# Patient Record
Sex: MALE | Race: WHITE | NOT HISPANIC OR LATINO
[De-identification: names, ages, dates, MRNs, and addresses within clinical notes are randomized per-mention and may not be internally consistent; named-entity substitution may affect disease eponyms.]

---

## 2020-10-22 PROBLEM — Z00.00 ENCOUNTER FOR PREVENTIVE HEALTH EXAMINATION: Status: ACTIVE | Noted: 2020-10-22

## 2020-10-26 ENCOUNTER — APPOINTMENT (OUTPATIENT)
Dept: INFECTIOUS DISEASE | Facility: CLINIC | Age: 20
End: 2020-10-26
Payer: COMMERCIAL

## 2020-10-26 ENCOUNTER — LABORATORY RESULT (OUTPATIENT)
Age: 20
End: 2020-10-26

## 2020-10-26 VITALS
BODY MASS INDEX: 22.13 KG/M2 | SYSTOLIC BLOOD PRESSURE: 121 MMHG | WEIGHT: 167 LBS | OXYGEN SATURATION: 96 % | TEMPERATURE: 98.8 F | HEART RATE: 86 BPM | DIASTOLIC BLOOD PRESSURE: 79 MMHG | HEIGHT: 73 IN

## 2020-10-26 DIAGNOSIS — A69.23 ARTHRITIS DUE TO LYME DISEASE: ICD-10-CM

## 2020-10-26 DIAGNOSIS — M25.569 PAIN IN UNSPECIFIED KNEE: ICD-10-CM

## 2020-10-26 PROCEDURE — 99205 OFFICE O/P NEW HI 60 MIN: CPT | Mod: 25

## 2020-10-26 PROCEDURE — 99072 ADDL SUPL MATRL&STAF TM PHE: CPT

## 2020-10-27 LAB
ALBUMIN SERPL ELPH-MCNC: 5 G/DL
ALP BLD-CCNC: 83 U/L
ALT SERPL-CCNC: 18 U/L
ANION GAP SERPL CALC-SCNC: 15 MMOL/L
AST SERPL-CCNC: 20 U/L
B BURGDOR AB SER-IMP: POSITIVE
B BURGDOR IGG+IGM SER QL IB: NORMAL
B BURGDOR IGM PATRN SER IB-IMP: NEGATIVE
B BURGDOR18KD IGG SER QL IB: PRESENT
B BURGDOR23KD IGG SER QL IB: PRESENT
B BURGDOR23KD IGM SER QL IB: NORMAL
B BURGDOR28KD IGG SER QL IB: PRESENT
B BURGDOR30KD IGG SER QL IB: NORMAL
B BURGDOR31KD IGG SER QL IB: PRESENT
B BURGDOR39KD IGG SER QL IB: PRESENT
B BURGDOR39KD IGM SER QL IB: NORMAL
B BURGDOR41KD IGG SER QL IB: PRESENT
B BURGDOR41KD IGM SER QL IB: PRESENT
B BURGDOR45KD IGG SER QL IB: NORMAL
B BURGDOR58KD IGG SER QL IB: PRESENT
B BURGDOR66KD IGG SER QL IB: PRESENT
B BURGDOR93KD IGG SER QL IB: PRESENT
BASOPHILS # BLD AUTO: 0.04 K/UL
BASOPHILS NFR BLD AUTO: 0.6 %
BILIRUB SERPL-MCNC: 0.2 MG/DL
BUN SERPL-MCNC: 14 MG/DL
CALCIUM SERPL-MCNC: 10 MG/DL
CHLORIDE SERPL-SCNC: 103 MMOL/L
CO2 SERPL-SCNC: 24 MMOL/L
CREAT SERPL-MCNC: 1.03 MG/DL
CRP SERPL-MCNC: 0.11 MG/DL
EOSINOPHIL # BLD AUTO: 0.09 K/UL
EOSINOPHIL NFR BLD AUTO: 1.4 %
ERYTHROCYTE [SEDIMENTATION RATE] IN BLOOD BY WESTERGREN METHOD: 2 MM/HR
FERRITIN SERPL-MCNC: 190 NG/ML
GLUCOSE SERPL-MCNC: 96 MG/DL
HCT VFR BLD CALC: 48 %
HGB BLD-MCNC: 15.7 G/DL
HIV1+2 AB SPEC QL IA.RAPID: NONREACTIVE
IMM GRANULOCYTES NFR BLD AUTO: 0.3 %
LYMPHOCYTES # BLD AUTO: 2.28 K/UL
LYMPHOCYTES NFR BLD AUTO: 34.4 %
MAN DIFF?: NORMAL
MCHC RBC-ENTMCNC: 29.6 PG
MCHC RBC-ENTMCNC: 32.7 GM/DL
MCV RBC AUTO: 90.6 FL
MONOCYTES # BLD AUTO: 0.53 K/UL
MONOCYTES NFR BLD AUTO: 8 %
NEUTROPHILS # BLD AUTO: 3.66 K/UL
NEUTROPHILS NFR BLD AUTO: 55.3 %
PLATELET # BLD AUTO: 268 K/UL
POTASSIUM SERPL-SCNC: 4.3 MMOL/L
PROT SERPL-MCNC: 7.1 G/DL
RBC # BLD: 5.3 M/UL
RBC # FLD: 11.9 %
SODIUM SERPL-SCNC: 142 MMOL/L
T PALLIDUM AB SER QL IA: NEGATIVE
WBC # FLD AUTO: 6.62 K/UL

## 2020-10-30 PROBLEM — M25.569 KNEE PAIN: Status: ACTIVE | Noted: 2020-10-30

## 2020-10-30 LAB
A PHAGO GROEL BLD QL NAA+NON-PROBE: NEGATIVE
ANA SER IF-ACNC: NEGATIVE
ANAPLASMA PHAGOCYTO IGM COMENT: NORMAL
ANAPLASMA PHAGOCYTO IGM COMMENT: NORMAL
ANAPLASMA PHAGOCYTOPHILIA IGG ANTIBODIES: NORMAL
ANAPLASMA PHAGOCYTOPHILIA IGM ANTIBODIES: NORMAL
B DIV+MO-1 18S RRNA BLD QL NAA+NON-PROBE: NEGATIVE
B DUNCANI 18S RRNA BLD QL NAA+NON-PROBE: NEGATIVE
B MICROTI 18S RRNA BLD QL NAA+NON-PROBE: NEGATIVE
C TRACH RRNA SPEC QL NAA+PROBE: NOT DETECTED
E CANIS+EWIN GROEL BLD QL NAA+NON-PROBE: NEGATIVE
E CHAFF GROEL BLD QL NAA+NON-PROBE: NEGATIVE
E MURIS EAUCL GROEL BLD QL NAA+NON-PRB: NEGATIVE
EHRLICIA CHAFFEENIS IGG ANTIBODIES: NORMAL
EHRLICIA CHAFFEENIS IGG COMMENT: NORMAL
EHRLICIA CHAFFEENIS IGG INTERP: NORMAL
EHRLICIA CHAFFEENIS IGM ANTIBODIES: NORMAL
N GONORRHOEA RRNA SPEC QL NAA+PROBE: NOT DETECTED
SOURCE AMPLIFICATION: NORMAL

## 2020-10-30 NOTE — CONSULT LETTER
[Dear  ___] : Dear  [unfilled], [Consult Letter:] : I had the pleasure of evaluating your patient, [unfilled]. [Please see my note below.] : Please see my note below. [Consult Closing:] : Thank you very much for allowing me to participate in the care of this patient.  If you have any questions, please do not hesitate to contact me. [Sincerely,] : Sincerely, [FreeTextEntry2] : Nkechi Becker MD\par [address], [place] [address]\par [phone] [FreeTextEntry1] : Mr. Pickard is a pleasant 20 year old man who presented to the ID clinic for findings of positive Lyme Western Blot in setting of left knee pain. There is concern for possible Lyme arthritis, but this is a challenging diagnosis to make. I will repeat the lyme serology to confirm, and recommend that he complete the course of doxycycline for now. I also sent off screening for other tick-borne diseases as well as STIs. [FreeTextEntry3] : Josh Tang MD\par Attending Physician\par Division of Infectious Disease, [facility]\par [address] [place] [address] Drive\[place] [address]\par [phone]

## 2020-10-30 NOTE — ASSESSMENT
[FreeTextEntry1] : 19 yo M with no PMH, presents with positive Lyme serology and knee pain\par Patient with L knee pain, waxing and waning\par Significantly positive Western Blot for Lyme\par Presently already on Doxycyline\par Possible lyme arthritis?\par Plan to repeat tests and as well as screen for other etiologies\par Overally, Lyme arthritis, lyme serology positive, knee pain\par - Doxy 100mg q 12, 28 days for now (already RXed by outside MD)\par - Repeat Lyme test including C6\par - Check other rickettsial, CRP, CMP, CBC, ferriting, HIV, STD screening\par - Will call patient with results

## 2020-10-30 NOTE — HISTORY OF PRESENT ILLNESS
[FreeTextEntry1] : 19 yo M with no PMH, presents with positive Lyme serology and knee pain\par L knee, pain was swollen, mild tenderness\par No fevers, no chills\par Has been camping this past summer, and hiking, did not specifically see any ticks\par Started on Doxycyline by outside MD\par Urgent care evaluated patient and found positive serology\par Has had prior episode of knee pain as well.\par MSM, sexually active with one partner\par Had recent testing for lyme with multiple IgG bands positive (reviewed outside record)\par \par In school at [employer]\par \par PMH\par None\par \par PSH\par None\par \par Soc\par No tobacco, no illicit drugs, yes alcohol\par 1 sexual partner (MSM)

## 2020-10-30 NOTE — PHYSICAL EXAM
[General Appearance - Alert] : alert [General Appearance - In No Acute Distress] : in no acute distress [General Appearance - Well Nourished] : well nourished [General Appearance - Well-Appearing] : healthy appearing [Sclera] : the sclera and conjunctiva were normal [Outer Ear] : the ears and nose were normal in appearance [Neck Appearance] : the appearance of the neck was normal [Respiration, Rhythm And Depth] : normal respiratory rhythm and effort [Heart Rate And Rhythm] : heart rate was normal and rhythm regular [Heart Sounds] : normal S1 and S2 [Bowel Sounds] : normal bowel sounds [Abdomen Soft] : soft [No Palpable Adenopathy] : no palpable adenopathy [Musculoskeletal - Swelling] : no joint swelling [] : no rash [Sensation] : the sensory exam was normal to light touch and pinprick [Motor Exam] : the motor exam was normal [Oriented To Time, Place, And Person] : oriented to person, place, and time [Affect] : the affect was normal

## 2022-02-06 ENCOUNTER — EMERGENCY (EMERGENCY)
Facility: HOSPITAL | Age: 22
LOS: 1 days | Discharge: HOME | End: 2022-02-06
Attending: STUDENT IN AN ORGANIZED HEALTH CARE EDUCATION/TRAINING PROGRAM
Payer: COMMERCIAL

## 2022-02-06 VITALS
HEIGHT: 73 IN | WEIGHT: 169.98 LBS | OXYGEN SATURATION: 97 % | TEMPERATURE: 98 F | HEART RATE: 91 BPM | DIASTOLIC BLOOD PRESSURE: 88 MMHG | SYSTOLIC BLOOD PRESSURE: 149 MMHG | RESPIRATION RATE: 20 BRPM

## 2022-02-06 PROCEDURE — 93005 ELECTROCARDIOGRAM TRACING: CPT

## 2022-02-06 PROCEDURE — 99284 EMERGENCY DEPT VISIT MOD MDM: CPT

## 2022-02-06 PROCEDURE — 99283 EMERGENCY DEPT VISIT LOW MDM: CPT

## 2022-02-06 PROCEDURE — 93010 ELECTROCARDIOGRAM REPORT: CPT | Mod: NC

## 2022-02-06 RX ORDER — ACETAMINOPHEN 500 MG
975 TABLET ORAL ONCE
Refills: 0 | Status: COMPLETED | OUTPATIENT
Start: 2022-02-06 | End: 2022-02-06

## 2022-02-06 RX ADMIN — Medication 975 MILLIGRAM(S): at 20:43
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
## 2022-02-06 NOTE — ED PROVIDER NOTE - PHYSICAL EXAMINATION
GENERAL: young male, lying in bed, NAD, but appears concerned. Vital signs are within normal limits  EYES: Conjunctiva noninjected or pale, sclera anicteric  HENT: NC/AT, moist mucous membranes  NECK: Supple, trachea midline  LUNG: Nonlabored respirations, no wheezes, rales  CV: RRR, Pulses- Radial/dorsalis pedis: 2+ bilateral and equal  ABDOMEN: Nondistended, nontender, no guarding  MSK: Nontender extremities. No midline spinal tenderness, step-offs, or deformities. No paraspinal tenderness  -Range of motion: Full range UE b/l (shoulder, elbow, wrist, fingers); LE b/l (hip, knee, ankle); nonrestricted flexion/extension/rotation of back  -Strength: 5/5 muscle strength UE/LE b/l  SKIN: No rashes, bruises  NEURO: AAOx4 (to person, place, time, event), no tremor, steady gait, sensation intact to touch  PSYCH: Normal mood and affect GENERAL: young male, lying in bed, NAD, but appears concerned. Vital signs are within normal limits  EYES: Conjunctiva noninjected or pale, sclera anicteric  HENT: NC/AT, moist mucous membranes, TMI, + light reflex, no mastoid tenderness  NECK: Supple, trachea midline  LUNG: Nonlabored respirations, no wheezes, rales  CV: RRR, Pulses- Radial/dorsalis pedis: 2+ bilateral and equal  ABDOMEN: Nondistended, nontender, no guarding  MSK: Nontender extremities. No midline spinal tenderness, step-offs, or deformities. No paraspinal tenderness  -Range of motion: Full range UE b/l (shoulder, elbow, wrist, fingers); LE b/l (hip, knee, ankle); nonrestricted flexion/extension/rotation of back  -Strength: 5/5 muscle strength UE/LE b/l  SKIN: No rashes, bruises  NEURO: AAOx4 (to person, place, time, event), no tremor, steady gait, sensation intact to touch  PSYCH: Normal mood and affect GENERAL: young male, lying in bed, NAD, but appears concerned. Vital signs are within normal limits  EYES: Conjunctiva noninjected or pale, sclera anicteric  HENT: NC/AT, moist mucous membranes, TMI, + light reflex, no mastoid tenderness  NECK: Supple, trachea midline  LUNG: Nonlabored respirations, no wheezes, rales  CV: RRR, Pulses- Radial/dorsalis pedis: 2+ bilateral and equal  ABDOMEN: Nondistended, nontender, no guarding  MSK: Nontender extremities. No midline spinal tenderness, step-offs, or deformities. No paraspinal tenderness  -Range of motion: Full range UE b/l (shoulder, elbow, wrist, fingers); LE b/l (hip, knee, ankle); nonrestricted flexion/extension/rotation of back  -Strength: 5/5 muscle strength UE/LE b/l  SKIN: No rashes. + bruise to left anterior knee cap, no crepitations or fluctuance, + left forearm bruise  NEURO: AAOx4 (to person, place, time, event), no tremor, steady gait, sensation intact to touch  PSYCH: Normal mood and affect
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
## 2022-02-06 NOTE — ED PROVIDER NOTE - PATIENT PORTAL LINK FT
You can access the FollowMyHealth Patient Portal offered by [facility] by registering at the following website: [email or web address]. By joining [employer]’s FollowMyHealth portal, you will also be able to view your health information using other applications (apps) compatible with our system.
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
## 2022-02-06 NOTE — ED PROVIDER NOTE - EKG ADDITIONAL INFORMATION FREE TEXT
NSR, no ST segement alterations. sinus rhythm without life-threatening arrhythmic patter such as short or long [place] interval, ipsilon wave form, or Brugada pattern.

## 2022-02-06 NOTE — ED ADULT TRIAGE NOTE - BP NONINVASIVE DIASTOLIC (MM HG)
Vascular Surgery  No lifting more than 10 lb or excessive movement until recheck.   No driving for 2 weeks.  Gently wash incisions with water and pat dry.  Apply a gauze dressing to the incisions and change the dressing daily for 3 days after surgery.  Activity and weight bearing as tolerated. You may walk outside and on stairs.  Elevate your feet to the level of your heart as much as possible to decrease swelling.  Call if any increased pain, swelling, redness, fever, discharge, or any sign of infection.      
88

## 2022-02-06 NOTE — ED PROVIDER NOTE - NS ED ROS FT
CONSTITUTIONAL: No fevers, chills, fatigue, weakness, dizziness  EYES: No loss of vision, double vision, blurry vision, photophobia,  ENT: + right ear ringing. No phonophobia, ear drainage, nasal congestion, runny nose, sore throat, jaw pain  CV: No chest pain  PULM: No shortness of breath  GI: + nausea. No vomiting  SKIN: No rashes  MSK: No neck pain or stiffness  NEURO: + headache. No paresthesias  PSYCHIATRIC: Denies suicidal, homicidal ideations. No auditory, visual, tactile hallucinations

## 2022-11-16 NOTE — ED PROVIDER NOTE - IV ALTEPLASE DOOR HIDDEN
Include Z78.9 (Other Specified Conditions Influencing Health Status) As An Associated Diagnosis?: No Detail Level: Detailed Render Post-Care Instructions In Note?: yes Post-Care Instructions: I reviewed with the patient in detail post-care instructions. Patient is to wear sunprotection, and avoid picking at any of the treated lesions. Pt may apply Vaseline to crusted or scabbing areas. Treatment Number (Will Not Render If 0): 0 Medical Necessity Clause: This procedure was medically necessary because the lesions that were treated were: Medical Necessity Information: It is in your best interest to select a reason for this procedure from the list below. All of these items fulfill various CMS LCD requirements except the new and changing color options. Consent: The patient's consent was obtained including but not limited to risks of crusting, scabbing, blistering, scarring, darker or lighter pigmentary change, recurrence, incomplete removal and infection. show

## 2022-11-29 ENCOUNTER — OUTPATIENT (OUTPATIENT)
Dept: OUTPATIENT SERVICES | Facility: HOSPITAL | Age: 22
LOS: 1 days | End: 2022-11-29
Payer: COMMERCIAL

## 2022-11-29 ENCOUNTER — APPOINTMENT (OUTPATIENT)
Dept: UROLOGY | Facility: CLINIC | Age: 22
End: 2022-11-29

## 2022-11-29 DIAGNOSIS — N50.811 RIGHT TESTICULAR PAIN: ICD-10-CM

## 2022-11-29 DIAGNOSIS — R35.0 FREQUENCY OF MICTURITION: ICD-10-CM

## 2022-11-29 DIAGNOSIS — N45.1 EPIDIDYMITIS: ICD-10-CM

## 2022-11-29 DIAGNOSIS — Z80.3 FAMILY HISTORY OF MALIGNANT NEOPLASM OF BREAST: ICD-10-CM

## 2022-11-29 DIAGNOSIS — Z80.8 FAMILY HISTORY OF MALIGNANT NEOPLASM OF OTHER ORGANS OR SYSTEMS: ICD-10-CM

## 2022-11-29 PROBLEM — Z78.9 OTHER SPECIFIED HEALTH STATUS: Chronic | Status: ACTIVE | Noted: 2022-02-06

## 2022-11-29 PROCEDURE — 76870 US EXAM SCROTUM: CPT

## 2022-11-29 PROCEDURE — 76870 US EXAM SCROTUM: CPT | Mod: 26

## 2022-11-29 PROCEDURE — 99204 OFFICE O/P NEW MOD 45 MIN: CPT | Mod: 25

## 2022-11-29 RX ORDER — SULFAMETHOXAZOLE AND TRIMETHOPRIM 800; 160 MG/1; MG/1
800-160 TABLET ORAL TWICE DAILY
Qty: 20 | Refills: 0 | Status: ACTIVE | COMMUNITY
Start: 2022-11-29 | End: 1900-01-01

## 2022-11-29 RX ORDER — ALBUTEROL SULFATE 90 UG/1
108 (90 BASE) INHALANT RESPIRATORY (INHALATION)
Qty: 8 | Refills: 0 | Status: DISCONTINUED | COMMUNITY
Start: 2022-10-12

## 2022-11-29 RX ORDER — ERYTHROMYCIN 5 MG/G
5 OINTMENT OPHTHALMIC
Qty: 4 | Refills: 0 | Status: DISCONTINUED | COMMUNITY
Start: 2022-06-29

## 2022-11-30 LAB
APPEARANCE: CLEAR
BACTERIA: NEGATIVE
BILIRUBIN URINE: NEGATIVE
BLOOD URINE: NEGATIVE
COLOR: NORMAL
GLUCOSE QUALITATIVE U: NEGATIVE
HYALINE CASTS: 0 /LPF
KETONES URINE: NEGATIVE
LEUKOCYTE ESTERASE URINE: NEGATIVE
MICROSCOPIC-UA: NORMAL
NITRITE URINE: NEGATIVE
PH URINE: 6.5
PROTEIN URINE: NEGATIVE
RED BLOOD CELLS URINE: 0 /HPF
SPECIFIC GRAVITY URINE: 1.01
SQUAMOUS EPITHELIAL CELLS: 0 /HPF
UROBILINOGEN URINE: NORMAL
WHITE BLOOD CELLS URINE: 0 /HPF

## 2022-12-02 PROBLEM — N50.811 RIGHT TESTICULAR PAIN: Status: ACTIVE | Noted: 2022-11-29

## 2022-12-02 LAB — BACTERIA UR CULT: NORMAL

## 2022-12-02 NOTE — HISTORY OF PRESENT ILLNESS
[FreeTextEntry1] : Ry Pickard presents to the office today.  He is a 22-year-old man who is here today with right-sided testicular pain that started yesterday and has gotten worse today.  He has not taken any medications to try to relieve the pain.  He noted what he felt to be a cyst next to the right testicle which was noticed first about 2 weeks ago and was nontender at that time.  He denies any urinary symptoms such as dysuria or hematuria.  There has been no penile or urethral discharge.  He has noticed an increase in urinary frequency but he attributes this to increasing fluid intake.  He denies any groin or scrotal trauma.  He denies any heavy lifting.\par

## 2022-12-02 NOTE — ASSESSMENT
[FreeTextEntry1] : Examination today shows bilaterally descended testicles with normal lie and orientation.  There does feel to be a cystic area posterior to the right testicle which is not particularly tender to exam.  Both testicles are with normal size and consistency.  There is no high riding testicle.  Cremasteric reflexes normal bilaterally.  There is mild tenderness of the right epididymis.\par \par Ultrasound examination today of the scrotum was also performed.  This shows that the right side is hypervascular comparatively with the left.  There is a multilobulated nonvascular cystic area measuring 5 x 2.4 cm noted inferior and lateral to the right testicle.  There are no mass lesions.  Blood flow to both testicles appears normal.\par \par There is no evidence of testicular torsion on exam or by ultrasound today.  The findings are consistent with a possible epididymitis.  I would recommend nonsteroidal anti-inflammatories, antibiotics, and scrotal support.  The patient will contact me should he not see relief of his symptoms.

## 2022-12-05 DIAGNOSIS — N45.1 EPIDIDYMITIS: ICD-10-CM

## 2022-12-05 DIAGNOSIS — N50.811 RIGHT TESTICULAR PAIN: ICD-10-CM

## 2023-01-03 ENCOUNTER — APPOINTMENT (OUTPATIENT)
Dept: UROLOGY | Facility: CLINIC | Age: 23
End: 2023-01-03

## 2025-03-19 NOTE — PHYSICAL EXAM
Copied from CRM #[id number]. Topic: MW Messaging - MW Patient Request  >> Mar 19, 2025 11:08 AM Beverly GERMAN wrote:  janusz called requesting to send a general message to clinician.   Verified issue is NOT regarding a symptom(s) requiring routine or emergent triage. Verified another message template type and CRM does not apply.    Selected 'Wrap Up CRM' and created new Telephone Encounter after clicking 'Convert to Clinical Call'. Selected appropriate Reason for Call.  Sent Pt message template and routed as routine priority per Clinician KB page to appropriate clinician pool. Readback full message.-- DO NOT REPLY / DO NOT REPLY ALL --  -- This inbox is not monitored. If this was sent to the wrong provider or department, reroute message to P E[facility] Reroute pool. --  -- Message is from Engagement Center Operations ([facility]) --    General Patient Message: asking for a call back to schedule a left knee injection for tomorrow 03/20.   Caller Information       Contact Date/Time Type Contact Phone/Fax    03/19/2025 11:06 AM CDT Phone (Incoming) janusz [phone]            Alternative phone number: no    Can a detailed message be left? Yes - Voicemail   Patient has been advised the message will be addressed within 2-3 business days.                 [General Appearance - Well Developed] : well developed [General Appearance - Well Nourished] : well nourished [Normal Appearance] : normal appearance [Well Groomed] : well groomed [General Appearance - In No Acute Distress] : no acute distress [Edema] : no peripheral edema [Respiration, Rhythm And Depth] : normal respiratory rhythm and effort [Exaggerated Use Of Accessory Muscles For Inspiration] : no accessory muscle use [Abdomen Soft] : soft [Abdomen Tenderness] : non-tender [Costovertebral Angle Tenderness] : no ~M costovertebral angle tenderness [Urethral Meatus] : meatus normal [Urinary Bladder Findings] : the bladder was normal on palpation [Scrotum] : the scrotum was normal [Testes Mass (___cm)] : there were no testicular masses [Normal Station and Gait] : the gait and station were normal for the patient's age [] : no rash [No Focal Deficits] : no focal deficits [Oriented To Time, Place, And Person] : oriented to person, place, and time [Affect] : the affect was normal [Mood] : the mood was normal [Not Anxious] : not anxious [No Palpable Adenopathy] : no palpable adenopathy